# Patient Record
Sex: MALE | Race: WHITE
[De-identification: names, ages, dates, MRNs, and addresses within clinical notes are randomized per-mention and may not be internally consistent; named-entity substitution may affect disease eponyms.]

---

## 2019-03-16 ENCOUNTER — HOSPITAL ENCOUNTER (EMERGENCY)
Dept: HOSPITAL 25 - UCCORT | Age: 19
Discharge: HOME | End: 2019-03-16
Payer: COMMERCIAL

## 2019-03-16 VITALS — SYSTOLIC BLOOD PRESSURE: 146 MMHG | DIASTOLIC BLOOD PRESSURE: 80 MMHG

## 2019-03-16 DIAGNOSIS — F17.210: ICD-10-CM

## 2019-03-16 DIAGNOSIS — J20.9: Primary | ICD-10-CM

## 2019-03-16 DIAGNOSIS — Z88.1: ICD-10-CM

## 2019-03-16 PROCEDURE — G0463 HOSPITAL OUTPT CLINIC VISIT: HCPCS

## 2019-03-16 PROCEDURE — 71046 X-RAY EXAM CHEST 2 VIEWS: CPT

## 2019-03-16 PROCEDURE — 99202 OFFICE O/P NEW SF 15 MIN: CPT

## 2019-03-16 NOTE — UC
Throat Pain/Nasal Dioni HPI





- HPI Summary


HPI Summary: 





17 yo male with productive cough x 2 days


chills


fatigue


states his grandfather has pneumonia





no n/v/d











- History of Current Complaint


Chief Complaint: UCRespiratory


Stated Complaint: SORE THROAT, COUGH


Time Seen by Provider: 03/16/19 11:47


Hx Obtained From: Patient


Onset/Duration: Gradual Onset, Lasting Days


Severity: Moderate


Pain Intensity: 4


Pain Scale Used: 0-10 Numeric


Cough: Productive - green


Associated Signs & Symptoms: Positive: Other - chills





- Epiglottits Risk Factors


Epiglottis Risk Factors: Negative





- Allergies/Home Medications


Allergies/Adverse Reactions: 


 Allergies











Allergy/AdvReac Type Severity Reaction Status Date / Time


 


azithromycin [From Zithromax] Allergy  Rash Verified 03/16/19 11:20














PMH/Surg Hx/FS Hx/Imm Hx


Previously Healthy: Yes





- Surgical History


Surgical History: Yes


Surgery Procedure, Year, and Place: T&A





- Family History


Known Family History: Positive: Hypertension





- Social History


Alcohol Use: None


Alcohol Amount: 10-11 beers


Substance Use Type: None


Smoking Status (MU): Heavy Every Day Tobacco Smoker


Type: Cigarettes


Amount Used/How Often: 1/2 ppd





- Immunization History


Vaccination Up to Date: Yes





Review of Systems


All Other Systems Reviewed And Are Negative: Yes


Constitutional: Positive: Chills, Fatigue


Skin: Positive: Negative


Eyes: Positive: Negative


ENT: Positive: Sore Throat


Respiratory: Positive: Cough


Cardiovascular: Positive: Negative


Gastrointestinal: Positive: Negative


Genitourinary: Positive: Negative


Motor: Positive: Negative


Neurovascular: Positive: Negative


Musculoskeletal: Positive: Negative


Neurological: Positive: Negative


Psychological: Positive: Negative





Physical Exam


Triage Information Reviewed: Yes


Appearance: Well-Appearing, No Pain Distress, Well-Nourished


Vital Signs: 


 Initial Vital Signs











Temp  97 F   03/16/19 11:21


 


Pulse  89   03/16/19 11:21


 


Resp  17   03/16/19 11:21


 


BP  146/80   03/16/19 11:21


 


Pulse Ox  98   03/16/19 11:21











Vital Signs Reviewed: Yes


Eyes: Positive: Conjunctiva Clear


ENT: Positive: Hearing grossly normal, Sinus tenderness, Uvula midline.  

Negative: Nasal congestion, Nasal drainage, Trismus, Muffled voice, Hoarse voice


Neck: Positive: Supple, Nontender, No Lymphadenopathy


Respiratory: Positive: Lungs clear, Normal breath sounds, No respiratory 

distress, No accessory muscle use


Cardiovascular: Positive: RRR, No Murmur


Musculoskeletal: Positive: ROM Intact, No Edema


Neurological: Positive: Alert


Psychological Exam: Normal


Skin Exam: Normal





Diagnostics





- Radiology


  ** No standard instances


Radiology Interpretation Completed By: Radiologist


Summary of Radiographic Findings: NAD





Throat Pain/Nasal Course/Dx





- Differential Dx/Diagnosis


Provider Diagnosis: 


 Acute bronchitis








Discharge





- Sign-Out/Discharge


Documenting (check all that apply): Patient Departure


All imaging exams completed and their final reports reviewed: Yes





- Discharge Plan


Condition: Stable


Disposition: HOME


Prescriptions: 


Amoxicillin PO (*) [Amoxicillin 875 MG (*)] 875 mg PO BID #14 tab


Patient Education Materials:  Acute Bronchitis (ED)


Forms:  *Work Release


Referrals: 


SHANT Chauhan [Primary Care Provider] - 





- Billing Disposition and Condition


Condition: STABLE


Disposition: Home

## 2019-06-15 ENCOUNTER — HOSPITAL ENCOUNTER (EMERGENCY)
Dept: HOSPITAL 25 - UCCORT | Age: 19
Discharge: HOME | End: 2019-06-15
Payer: COMMERCIAL

## 2019-06-15 VITALS — SYSTOLIC BLOOD PRESSURE: 126 MMHG | DIASTOLIC BLOOD PRESSURE: 69 MMHG

## 2019-06-15 DIAGNOSIS — Z88.1: ICD-10-CM

## 2019-06-15 DIAGNOSIS — R07.89: ICD-10-CM

## 2019-06-15 DIAGNOSIS — F17.210: ICD-10-CM

## 2019-06-15 DIAGNOSIS — K21.9: Primary | ICD-10-CM

## 2019-06-15 PROCEDURE — G0463 HOSPITAL OUTPT CLINIC VISIT: HCPCS

## 2019-06-15 PROCEDURE — 99212 OFFICE O/P EST SF 10 MIN: CPT

## 2019-06-15 PROCEDURE — 93005 ELECTROCARDIOGRAM TRACING: CPT

## 2019-06-15 NOTE — UC
Cardiac HPI





- HPI Summary


HPI Summary: 


Had sudden onset of sharp lower sternal chest pain with nausea, but no SOB. 

Felt nauseated first like he was going to throw up then had the onset of pain. 

Does have issues with reflux.





- History of Current Complaint


Chief Complaint: UCChestPain


Stated Complaint: CHEST PAIN


Time Seen by Provider: 06/15/19 19:04


Hx Obtained From: Patient


Onset/Duration: Sudden Onset, Lasting Minutes - 30


Timing: Constant


Initial Severity: Severe


Current Severity: Severe


Pain Intensity: 9


Chest Pain Location: Lower Sternal


Character: Sharp/Stabbing


Aggravating Factor(s): Nothing


Alleviating Factor(s): Nothing


Associated Signs & Symptoms: Positive: Chest Pain, Nausea/Vomiting.  Negative: 

Numbness, SOB, Fever, Palpitations





- Risk Factors


Pulmonary Embolism Risk Factors: Smoking


Cardiac Risk Factors: Smoking, Family History





- Allergy/Home Medications


Allergies/Adverse Reactions: 


 Allergies











Allergy/AdvReac Type Severity Reaction Status Date / Time


 


azithromycin [From Zithromax] Allergy  Rash Verified 06/15/19 19:06











Home Medications: 


 Home Medications





NK [No Home Medications Reported]  06/15/19 [History Confirmed 06/15/19]











PMH/Surg Hx/FS Hx/Imm Hx


Previously Healthy: Yes





- Surgical History


Surgical History: Yes


Surgery Procedure, Year, and Place: T&A





- Family History


Known Family History: Positive: Cardiac Disease, Hypertension, Diabetes





- Social History


Occupation: Employed Part-time


Lives: With Family


Alcohol Use: Occasionally


Alcohol Amount: 10-11 beers


Substance Use Type: None


Smoking Status (MU): Heavy Every Day Tobacco Smoker


Type: Cigarettes


Amount Used/How Often: LESS THAN 1/2 PACK PER WEEK


Have You Smoked in the Last Year: Yes


Household Exposure Type: Cigarettes


Cessation Counseling: Patient Advised to Stop





- Immunization History


Vaccination Up to Date: Yes





Review of Systems


All Other Systems Reviewed And Are Negative: Yes


Cardiovascular: Positive: Chest Pain


Gastrointestinal: Positive: Nausea, Other - acid reflux


Psychological: Positive: Anxious


Is Patient Immunocompromised?: No





Physical Exam


Triage Information Reviewed: Yes


Appearance: Well-Appearing, Pain Distress, Obese


Vital Signs: 


 Initial Vital Signs











Temp  98.3 F   06/15/19 19:06


 


Pulse  78   06/15/19 19:06


 


Resp  20   06/15/19 19:06


 


BP  126/69   06/15/19 19:06


 


Pulse Ox  100   06/15/19 19:06











Vital Signs Reviewed: Yes


Eyes: Positive: Conjunctiva Inflamed - from crying


ENT: Positive: Pharynx normal


Neck exam: Normal


Respiratory Exam: Normal


Cardiovascular Exam: Normal


Abdomen Description: Positive: No Organomegaly.  Negative: Nontender - mild 

epigastric tenderness


Bowel Sounds: Positive: Present


Musculoskeletal Exam: Normal


Neurological Exam: Normal


Psychological Exam: Normal


Skin Exam: Normal





Diagnostics





- EKG


Cardiac Rate: Bradycardia


Cardiac Rhythm: Sinus: Normal


Ectopy: None


ST Segment: Normal


Summary of EKG Findings: Normal EKG





- Differential Diagnoses - Chest Pain


Differential Diagnosis/HQI/PQRI: Chest Wall, GI Disease, Pulmonary Embolism





- Differential Diagnoses - Palpitations


Differential Diagnosis/HQI/PQRI: Panic Disorder





- Clinical Impression


Provider Diagnosis: 


 Chest pain due to gastrointestinal reflux disease








Discharge





- Sign-Out/Discharge


Documenting (check all that apply): Patient Departure


All imaging exams completed and their final reports reviewed: No Studies





- Discharge Plan


Condition: Stable


Disposition: HOME


Patient Education Materials:  Gastroesophageal Reflux Disease (ED), Esophageal 

Spasm (ED)


Referrals: 


SHANT Chauhan [Medical Doctor] - 


Additional Instructions: 


Quit smoking completely.


Decrease caffeine. 


Avoid overeating, especially fatty meals.


Drink more water.


Work on exercise and weight loss.





- Billing Disposition and Condition


Condition: STABLE


Disposition: Home

## 2020-01-21 ENCOUNTER — HOSPITAL ENCOUNTER (EMERGENCY)
Dept: HOSPITAL 25 - UCCORT | Age: 20
Discharge: HOME | End: 2020-01-21
Payer: SELF-PAY

## 2020-01-21 VITALS — SYSTOLIC BLOOD PRESSURE: 118 MMHG | DIASTOLIC BLOOD PRESSURE: 64 MMHG

## 2020-01-21 DIAGNOSIS — F17.210: ICD-10-CM

## 2020-01-21 DIAGNOSIS — J06.9: Primary | ICD-10-CM

## 2020-01-21 DIAGNOSIS — Z88.1: ICD-10-CM

## 2020-01-21 DIAGNOSIS — H92.03: ICD-10-CM

## 2020-01-21 PROCEDURE — 99211 OFF/OP EST MAY X REQ PHY/QHP: CPT

## 2020-01-21 PROCEDURE — G0463 HOSPITAL OUTPT CLINIC VISIT: HCPCS

## 2020-01-21 NOTE — UC
Throat Pain/Nasal Dioni HPI





- HPI Summary


HPI Summary: 





sore throat x 1 day


woke up this morning with sore throat


mild cough , bilateral ear pain , no fever 


+ chills 





- History of Current Complaint


Chief Complaint: UCGeneralIllness


Stated Complaint: ST


Time Seen by Provider: 01/21/20 08:52


Hx Obtained From: Patient


Onset/Duration: Gradual Onset, Lasting Days - 1, Still Present


Severity: Moderate


Pain Intensity: 8


Cough: Nonproductive


Associated Signs & Symptoms: Positive: Nasal Discharge.  Negative: Wheezing, 

Hoarseness, Sinus Discomfort, Fever





- Allergies/Home Medications


Allergies/Adverse Reactions: 


 Allergies











Allergy/AdvReac Type Severity Reaction Status Date / Time


 


azithromycin [From Zithromax] Allergy  Rash Verified 01/21/20 08:55














PMH/Surg Hx/FS Hx/Imm Hx


Previously Healthy: Yes





- Surgical History


Surgical History: Yes


Surgery Procedure, Year, and Place: T&A





- Family History


Known Family History: Positive: Cardiac Disease, Hypertension, Diabetes





- Social History


Alcohol Use: None


Alcohol Amount: 10-11 beers


Substance Use Type: None


Smoking Status (MU): Heavy Every Day Tobacco Smoker


Type: Cigarettes


Amount Used/How Often: LESS THAN 1/2 PACK PER WEEK


Have You Smoked in the Last Year: Yes


Household Exposure Type: Cigarettes





- Immunization History


Vaccination Up to Date: Yes





Review of Systems


All Other Systems Reviewed And Are Negative: Yes


Constitutional: Positive: Negative


Skin: Positive: Negative


Eyes: Positive: Negative


ENT: Positive: Sore Throat, Nasal Discharge


Respiratory: Positive: Cough


Cardiovascular: Positive: Negative


Is Patient Immunocompromised?: No





Physical Exam


Triage Information Reviewed: Yes


Appearance: Well-Appearing, No Pain Distress, Well-Nourished


Vital Signs: 


 Initial Vital Signs











Temp  97.9 F   01/21/20 08:56


 


Pulse  67   01/21/20 08:56


 


Resp  16   01/21/20 08:56


 


BP  118/64   01/21/20 08:56


 


Pulse Ox  100   01/21/20 08:56











Vital Signs Reviewed: Yes


Eyes: Positive: Conjunctiva Clear


ENT: Positive: Normal ENT inspection, Hearing grossly normal, Pharynx normal, 

Nasal congestion, TMs normal.  Negative: Pharyngeal erythema, Nasal drainage, 

TM bulging, TM dull, TM red, Tonsillar swelling, Tonsillar exudate


Neck: Positive: Supple, Nontender, No Lymphadenopathy


Respiratory: Positive: Chest non-tender, Lungs clear, Normal breath sounds


Cardiovascular: Positive: RRR, No Murmur, Pulses Normal


Skin Exam: Normal





Throat Pain/Nasal Course/Dx





- Differential Dx/Diagnosis


Provider Diagnosis: 


 URI (upper respiratory infection)








Discharge ED





- Sign-Out/Discharge


Documenting (check all that apply): Patient Departure


All imaging exams completed and their final reports reviewed: No Studies





- Discharge Plan


Condition: Stable


Disposition: HOME


Patient Education Materials:  Upper Respiratory Infection (ED)


Forms:  *Work Release


Referrals: 


No Primary Care Phys,NOPCP [Primary Care Provider] - If Needed





- Billing Disposition and Condition


Condition: STABLE


Disposition: Home